# Patient Record
Sex: FEMALE | Employment: FULL TIME | URBAN - METROPOLITAN AREA
[De-identification: names, ages, dates, MRNs, and addresses within clinical notes are randomized per-mention and may not be internally consistent; named-entity substitution may affect disease eponyms.]

---

## 2017-04-13 ENCOUNTER — TRANSFERRED RECORDS (OUTPATIENT)
Dept: HEALTH INFORMATION MANAGEMENT | Facility: CLINIC | Age: 27
End: 2017-04-13

## 2019-11-16 NOTE — TELEPHONE ENCOUNTER
DIAGNOSIS: Scoliosis - Referral from [facility] [place] Dr. Sandeep Lockwood    APPOINTMENT DATE: 1/16/20   NOTES STATUS DETAILS   OFFICE NOTE from referring provider In process Fax to [facility] to obtain recs - 11/16    OFFICE NOTE from other specialist In process    DISCHARGE SUMMARY from hospital N/A    DISCHARGE REPORT from the ER N/A    OPERATIVE REPORT In process    MEDICATION LIST In process    IMPLANT RECORD/STICKER In process    LABS     CBC/DIFF In process    CULTURES N/A    INJECTIONS DONE IN RADIOLOGY In process    MRI In process    CT SCAN In process    XRAYS (IMAGES & REPORTS) In process

## 2020-01-16 ENCOUNTER — PRE VISIT (OUTPATIENT)
Dept: ORTHOPEDICS | Facility: CLINIC | Age: 30
End: 2020-01-16